# Patient Record
Sex: MALE | HISPANIC OR LATINO | URBAN - METROPOLITAN AREA
[De-identification: names, ages, dates, MRNs, and addresses within clinical notes are randomized per-mention and may not be internally consistent; named-entity substitution may affect disease eponyms.]

---

## 2017-01-03 ENCOUNTER — ANESTHESIA (OUTPATIENT)
Dept: PAIN MEDICINE | Facility: HOSPITAL | Age: 52
End: 2017-01-03

## 2017-01-03 ENCOUNTER — APPOINTMENT (OUTPATIENT)
Dept: GENERAL RADIOLOGY | Facility: HOSPITAL | Age: 52
End: 2017-01-03

## 2017-01-03 ENCOUNTER — ANESTHESIA EVENT (OUTPATIENT)
Dept: PAIN MEDICINE | Facility: HOSPITAL | Age: 52
End: 2017-01-03

## 2017-01-03 ENCOUNTER — APPOINTMENT (OUTPATIENT)
Dept: PAIN MEDICINE | Facility: HOSPITAL | Age: 52
End: 2017-01-03

## 2017-01-03 PROCEDURE — 77003 FLUOROGUIDE FOR SPINE INJECT: CPT

## 2017-01-03 NOTE — ANESTHESIA PROCEDURE NOTES
PAIN Epidural block    Patient location during procedure: pain clinic  Indication:procedure for pain  Performed By  Anesthesiologist: GILA VEGA  Preanesthetic Checklist  Completed: patient identified, site marked, surgical consent, pre-op evaluation, timeout performed, risks and benefits discussed and monitors and equipment checked  Additional Notes  Depomedrol - 80mg    Needle position confirmed by fluoroscopy and epidurogram using 2cc of [id number].    Diagnosis  Post-Op Diagnosis Codes:     * Lumbar spinal stenosis (M48.06)    Epidural Block Prep:  Pt Position:prone  Sterile Tech:cap, gloves, mask and sterile barrier  Monitoring:blood pressure monitoring, continuous pulse oximetry and EKG  Epidural Block Procedure:  Approach:right paramedian  Location:lumbar  Level:4-5  Needle Type:Tuohy  Needle Gauge:20  Aspiration:negative  Medications:  Depomedrol:80 mg  Preservative Free Saline:2mL  Isovue:2mL    Post Assessment:  Post-procedure: bandaide.  Pt Tolerance:patient tolerated the procedure well with no apparent complications  Complications:no

## 2017-01-03 NOTE — ADDENDUM NOTE
Addendum  created 01/03/17 1036 by Segundo Palomino MD    Child order released for a procedure order, Order Reconciliation Section accessed, Order sets accessed, Sign clinical note

## 2017-01-19 ENCOUNTER — OFFICE VISIT (OUTPATIENT)
Dept: NEUROSURGERY | Facility: CLINIC | Age: 52
End: 2017-01-19

## 2017-01-19 VITALS
BODY MASS INDEX: 22.9 KG/M2 | SYSTOLIC BLOOD PRESSURE: 125 MMHG | WEIGHT: 160 LBS | HEART RATE: 111 BPM | DIASTOLIC BLOOD PRESSURE: 73 MMHG | HEIGHT: 70 IN

## 2017-01-19 DIAGNOSIS — M21.372 LEFT FOOT DROP: ICD-10-CM

## 2017-01-19 DIAGNOSIS — M48.061 STENOSIS, SPINAL, LUMBAR: Primary | ICD-10-CM

## 2017-01-19 PROCEDURE — 99213 OFFICE O/P EST LOW 20 MIN: CPT | Performed by: NURSE PRACTITIONER

## 2017-01-19 NOTE — PROGRESS NOTES
Subjective   Patient ID: Mainor Gruber is a 51 y.o. male is here today for hospital follow-up on lumbar and right leg pain related to stenosis.  He has a history of multiple sclerosis and chronic left foot drop.  He was also treated for an exacerbation of multiple sclerosis during that hospitalization.  He did receive one lumbar epidural steroid injection and states that his back and right leg pain has resolved.  He is currently at [facility] for subacute rehabilitation.  He is not currently taking any pain medication.    Back Pain   This is a recurrent problem. The current episode started more than 1 month ago. The problem occurs rarely. The problem has been resolved since onset. The pain does not radiate. Associated symptoms include weakness (has chronic left foot drop related to multiple sclerosis.  He is wearing a left AFO brace.). Pertinent negatives include no numbness or tingling.       The following portions of the patient's history were reviewed and updated as appropriate: allergies, current medications, past family history, past medical history, past social history, past surgical history and problem list.    Review of Systems   Musculoskeletal: Positive for back pain.   Neurological: Positive for weakness (has chronic left foot drop related to multiple sclerosis.  He is wearing a left AFO brace.). Negative for tingling and numbness.   All other systems reviewed and are negative.      Objective   Physical Exam   Constitutional: He is oriented to person, place, and time. He appears well-developed and well-nourished. He is cooperative. No distress.   HENT:   Head: Normocephalic and atraumatic.   Eyes: Conjunctivae are normal.   Neck: Normal range of motion. Neck supple.   Cardiovascular: Normal rate.    Pulmonary/Chest: Effort normal. No respiratory distress.   Abdominal: Soft. He exhibits no distension. There is no tenderness.   Musculoskeletal: He exhibits no edema or tenderness.   Neurological: He  is alert and oriented to person, place, and time. He displays normal reflexes. No sensory deficit. Coordination normal. GCS eye subscore is 4. GCS verbal subscore is 5. GCS motor subscore is 6.   Reflex Scores:       Tricep reflexes are 2+ on the right side and 2+ on the left side.       Bicep reflexes are 2+ on the right side and 2+ on the left side.       Brachioradialis reflexes are 2+ on the right side and 2+ on the left side.       Patellar reflexes are 2+ on the right side and 2+ on the left side.       Achilles reflexes are 2+ on the right side and 2+ on the left side.  Negative Spain's; negative clonus   Skin: Skin is warm and dry. No rash noted. He is not diaphoretic.   Psychiatric: He has a normal mood and affect. His speech is normal. Thought content normal.   Vitals reviewed.    Neurologic Exam     Mental Status   Oriented to person, place, and time.   Speech: speech is normal   Level of consciousness: alert    Motor Exam   Muscle bulk: decreased  Overall muscle tone: decreased    Strength   Left strength: As a chronic left foot drop and is wearing an AFO brace.  He had decent strength in his upper and lower extremities otherwise that for some mild wrist drop on the right.    Gait, Coordination, and Reflexes     Reflexes   Right brachioradialis: 2+  Left brachioradialis: 2+  Right biceps: 2+  Left biceps: 2+  Right triceps: 2+  Left triceps: 2+  Right patellar: 2+  Left patellar: 2+  Right achilles: 2+  Left achilles: 2+      Assessment/Plan   Independent Review of Radiographic Studies:      I reviewed the MRI images of the lumbar spine dated December 29, 2016 today in the office.  The MRI showed multilevel degenerative changes with a right sided disc protrusion at L4-5 contributing to right neural foraminal narrowing.    Medical Decision Making:       returns to the office today with his wife.  He is in a wheelchair due to his multiple sclerosis.  He is undergoing subacute rehabilitation at  [facility] and he feels that he is improving.  We saw him in the emergency room last, he had gotten one lumbar epidural steroid injection.  Since that time his right leg pain has improved.  His back pain is mild but manageable. Now Mr. Gruber is working on overall strengthening due to the recent MS exacerbation.  From a neurosurgical standpoint, there is no need for follow-up unless his symptoms return.  We will see him as needed from here.      Mainor was seen today for back pain.    Diagnoses and all orders for this visit:    Stenosis, spinal, lumbar    Left foot drop      Return if symptoms worsen or fail to improve.

## 2017-01-19 NOTE — MR AVS SNAPSHOT
Mainor Gruber   1/19/2017 11:00 AM   Office Visit    Dept Phone:  [phone]   Encounter #:  [id number]    Provider:  GILBERT Biswas   Department:  [facility] NEUROSURGERY                Your Full Care Plan              Your Updated Medication List          This list is accurate as of: 1/19/17 12:04 PM.  Always use your most recent med list.                acetaminophen 325 MG tablet   Commonly known as:  TYLENOL   Take 2 tablets by mouth Every 6 (Six) Hours As Needed for mild pain (1-3) or fever.       aluminum-magnesium hydroxide-simethicone 200-200-20 MG/5ML suspension   Commonly known as:  MAALOX/MYLANTA   Take 30 mL by mouth Every 6 (Six) Hours As Needed for heartburn.       baclofen 10 MG tablet   Commonly known as:  LIORESAL       bisacodyl 10 MG suppository   Commonly known as:  DULCOLAX   Insert 1 suppository into the rectum Daily As Needed for constipation.       calcium carbonate 500 MG chewable tablet   Commonly known as:  TUMS   Chew 1 tablet 2 (Two) Times a Day As Needed for heartburn.       escitalopram 10 MG tablet   Commonly known as:  LEXAPRO       insulin aspart 100 UNIT/ML injection   Commonly known as:  novoLOG   Inject 12 Units under the skin 3 (Three) Times a Day With Meals.       insulin detemir 100 UNIT/ML injection   Commonly known as:  LEVEMIR   Inject 35 Units under the skin Every Night.       interferon beta-1A 44 MCG/0.5ML injection   Commonly known as:  REBIF   Inject 0.5 mL under the skin 3 (Three) Times a Week.       lisinopril-hydrochlorothiazide 10-12.5 MG per tablet   Commonly known as:  PRINZIDE,ZESTORETIC       METFORMIN HCL PO       predniSONE 10 MG tablet   Commonly known as:  DELTASONE   Take 4 tabs daily for 2 days, then 2 tabs for 2 days, then 1 tab for 2 days then 1/2 tab for 2 days       TOLTERODINE TARTRATE ER PO               You Were Diagnosed With        Codes Comments    Stenosis, spinal, lumbar    -  Primary  "ICD-10-CM: M48.06  ICD-9-CM: 724.02     Left foot drop     ICD-10-CM: M21.372  ICD-9-CM: 736.79       Instructions     None    Patient Instructions History      Upcoming Appointments     Visit Type Date Time Department    FOLLOW UP 2017 11:00 AM [facility]      [employer] Signup     [facility] [employer] allows you to send messages to your doctor, view your test results, renew your prescriptions, schedule appointments, and more. To sign up, go to [employer] and click on the Sign Up Now link in the New User? box. Enter your [employer] Activation Code exactly as it appears below along with the last four digits of your Social Security Number and your Date of Birth () to complete the sign-up process. If you do not sign up before the expiration date, you must request a new code.    [employer] Activation Code: [email or web address]  Expires: 2017  5:12 PM    If you have questions, you can email [email or web address] or call [phone] to talk to our [employer] staff. Remember, [employer] is NOT to be used for urgent needs. For medical emergencies, dial 911.               Other Info from Your Visit           Allergies     No Known Allergies      Reason for Visit     Back Pain           Vital Signs     Blood Pressure Pulse Height Weight Body Mass Index Smoking Status    125/73 111 70\" (177.8 cm) 160 lb (72.6 kg) 22.96 kg/m2 Never Smoker      Problems and Diagnoses Noted     Stenosis, spinal, lumbar    -  Primary    Left foot drop          No Longer an Issue     Right leg pain        "

## 2021-05-06 ENCOUNTER — IMMUNIZATION (OUTPATIENT)
Dept: VACCINE CLINIC | Facility: HOSPITAL | Age: 56
End: 2021-05-06

## 2021-05-06 PROCEDURE — 91300 HC SARSCOV02 VAC 30MCG/0.3ML IM: CPT | Performed by: INTERNAL MEDICINE

## 2021-05-06 PROCEDURE — 0001A: CPT | Performed by: INTERNAL MEDICINE

## 2021-05-27 ENCOUNTER — IMMUNIZATION (OUTPATIENT)
Dept: VACCINE CLINIC | Facility: HOSPITAL | Age: 56
End: 2021-05-27

## 2021-05-27 PROCEDURE — 0002A: CPT | Performed by: INTERNAL MEDICINE

## 2021-05-27 PROCEDURE — 91300 HC SARSCOV02 VAC 30MCG/0.3ML IM: CPT | Performed by: INTERNAL MEDICINE
